# Patient Record
Sex: FEMALE | Race: BLACK OR AFRICAN AMERICAN | Employment: FULL TIME | URBAN - METROPOLITAN AREA
[De-identification: names, ages, dates, MRNs, and addresses within clinical notes are randomized per-mention and may not be internally consistent; named-entity substitution may affect disease eponyms.]

---

## 2019-11-05 ENCOUNTER — APPOINTMENT (OUTPATIENT)
Dept: GENERAL RADIOLOGY | Age: 29
End: 2019-11-05
Attending: EMERGENCY MEDICINE
Payer: MEDICAID

## 2019-11-05 ENCOUNTER — HOSPITAL ENCOUNTER (EMERGENCY)
Age: 29
Discharge: HOME | End: 2019-11-05
Attending: EMERGENCY MEDICINE
Payer: MEDICAID

## 2019-11-05 VITALS
HEART RATE: 103 BPM | OXYGEN SATURATION: 100 % | HEIGHT: 63 IN | RESPIRATION RATE: 16 BRPM | TEMPERATURE: 98.1 F | SYSTOLIC BLOOD PRESSURE: 130 MMHG | BODY MASS INDEX: 27.64 KG/M2 | DIASTOLIC BLOOD PRESSURE: 88 MMHG | WEIGHT: 156 LBS

## 2019-11-05 DIAGNOSIS — M25.511 PAIN IN JOINT OF RIGHT SHOULDER: Primary | ICD-10-CM

## 2019-11-05 PROCEDURE — 99283 EMERGENCY DEPT VISIT LOW MDM: CPT | Performed by: PHYSICIAN ASSISTANT

## 2019-11-05 PROCEDURE — A4565 SLINGS: HCPCS

## 2019-11-05 PROCEDURE — 74011250637 HC RX REV CODE- 250/637: Performed by: PHYSICIAN ASSISTANT

## 2019-11-05 PROCEDURE — 73030 X-RAY EXAM OF SHOULDER: CPT

## 2019-11-05 RX ORDER — NAPROXEN 500 MG/1
500 TABLET ORAL 2 TIMES DAILY WITH MEALS
Qty: 20 TAB | Refills: 0 | Status: SHIPPED | OUTPATIENT
Start: 2019-11-05 | End: 2019-11-15

## 2019-11-05 RX ORDER — METOPROLOL SUCCINATE 25 MG/1
25 TABLET, EXTENDED RELEASE ORAL DAILY
COMMUNITY

## 2019-11-05 RX ORDER — ACETAMINOPHEN 325 MG/1
650 TABLET ORAL
Status: COMPLETED | OUTPATIENT
Start: 2019-11-05 | End: 2019-11-05

## 2019-11-05 RX ADMIN — ACETAMINOPHEN 650 MG: 325 TABLET, FILM COATED ORAL at 12:32

## 2019-11-05 NOTE — ED NOTES
I have reviewed discharge instructions with the patient. The patient verbalized understanding. Patient left ED via Discharge Method: ambulatory to Home with sister. Opportunity for questions and clarification provided. Patient given 1 scripts. To continue your aftercare when you leave the hospital, you may receive an automated call from our care team to check in on how you are doing. This is a free service and part of our promise to provide the best care and service to meet your aftercare needs.  If you have questions, or wish to unsubscribe from this service please call [phone]. Thank you for Choosing our [address] [place] [address] Emergency Department.

## 2019-11-05 NOTE — ED PROVIDER NOTES
Patient states she fell down about 5 stairs at work today landing on her right shoulder, no other injury noted, she is right-handed, she did drive herself here    The history is provided by the patient. Fall   The accident occurred less than 1 hour ago. Fall occurred: down 5 stairs  She fell from a height of ground level. She landed on hard floor. There was no blood loss. The point of impact was the right shoulder. The pain is at a severity of 9/10. The pain is moderate. She was ambulatory at the scene. There was no entrapment after the fall. There was no drug use involved in the accident. There was no alcohol use involved in the accident. Pertinent negatives include no numbness and no loss of consciousness. The symptoms are aggravated by activity. She has tried nothing for the symptoms. The treatment provided no relief. Shoulder Pain    Pertinent negatives include no numbness. Past Medical History:   Diagnosis Date    Ill-defined condition     tachycardia       Past Surgical History:   Procedure Laterality Date    HX  SECTION           History reviewed. No pertinent family history.     Social History     Socioeconomic History    Marital status: SINGLE     Spouse name: Not on file    Number of children: Not on file    Years of education: Not on file    Highest education level: Not on file   Occupational History    Not on file   Social Needs    Financial resource strain: Not on file    Food insecurity:     Worry: Not on file     Inability: Not on file    Transportation needs:     Medical: Not on file     Non-medical: Not on file   Tobacco Use    Smoking status: Never Smoker    Smokeless tobacco: Never Used   Substance and Sexual Activity    Alcohol use: Not Currently    Drug use: Not Currently    Sexual activity: Not on file   Lifestyle    Physical activity:     Days per week: Not on file     Minutes per session: Not on file    Stress: Not on file   Relationships    Social connections:     Talks on phone: Not on file     Gets together: Not on file     Attends Mandaeism service: Not on file     Active member of club or organization: Not on file     Attends meetings of clubs or organizations: Not on file     Relationship status: Not on file    Intimate partner violence:     Fear of current or ex partner: Not on file     Emotionally abused: Not on file     Physically abused: Not on file     Forced sexual activity: Not on file   Other Topics Concern    Not on file   Social History Narrative    Not on file         ALLERGIES: Patient has no known allergies. Review of Systems   Neurological: Negative for loss of consciousness and numbness. All other systems reviewed and are negative. Vitals:    11/05/19 1147 11/05/19 1216   BP: 130/88    Pulse: (!) 103    Resp: 16    Temp: 98.1 °F (36.7 °C)    SpO2: 100% 100%   Weight: 70.8 kg (156 lb)    Height: 5' 3\" (1.6 m)             Physical Exam   Constitutional: She is oriented to person, place, and time. She appears well-developed and well-nourished. No distress. HENT:   Head: Normocephalic and atraumatic. Eyes: Pupils are equal, round, and reactive to light. EOM are normal.   Neck: Normal range of motion. Neck supple. Cardiovascular: Normal rate and regular rhythm. Pulmonary/Chest: Effort normal and breath sounds normal.   Abdominal: Soft. Bowel sounds are normal.   Musculoskeletal: Normal range of motion. She exhibits tenderness. She exhibits no edema or deformity. Right shoulder without any obvious abrasions or deformity, pain to palpation anterior right shoulder area, full passive range of motion but limited active range of motion due to pain. No neck pain, no pain to the mid or lower back. Neurological: She is alert and oriented to person, place, and time. Skin: Skin is warm. She is not diaphoretic. Psychiatric: She has a normal mood and affect. Nursing note and vitals reviewed.        MDM  Number of Diagnoses or Management Options  Diagnosis management comments: Right shoulder x-rays negative for any fracture dislocation  Placed in sling give prescription for Naprosyn and follow-up at work well for routine follow-up       Amount and/or Complexity of Data Reviewed  Tests in the radiology section of CPT®: ordered and reviewed  Review and summarize past medical records: yes    Risk of Complications, Morbidity, and/or Mortality  Presenting problems: low  Diagnostic procedures: low  Management options: low    Patient Progress  Patient progress: improved         Procedures